# Patient Record
Sex: FEMALE | Race: BLACK OR AFRICAN AMERICAN | Employment: UNEMPLOYED | URBAN - METROPOLITAN AREA
[De-identification: names, ages, dates, MRNs, and addresses within clinical notes are randomized per-mention and may not be internally consistent; named-entity substitution may affect disease eponyms.]

---

## 2022-02-28 ENCOUNTER — HOSPITAL ENCOUNTER (EMERGENCY)
Age: 22
Discharge: LEFT BEFORE DISCHARGE | End: 2022-03-01
Attending: EMERGENCY MEDICINE
Payer: COMMERCIAL

## 2022-02-28 VITALS
WEIGHT: 160 LBS | OXYGEN SATURATION: 100 % | TEMPERATURE: 98.6 F | BODY MASS INDEX: 24.25 KG/M2 | DIASTOLIC BLOOD PRESSURE: 94 MMHG | HEART RATE: 105 BPM | RESPIRATION RATE: 16 BRPM | HEIGHT: 68 IN | SYSTOLIC BLOOD PRESSURE: 116 MMHG

## 2022-02-28 DIAGNOSIS — T74.21XA SEXUAL ASSAULT OF ADULT, INITIAL ENCOUNTER: Primary | ICD-10-CM

## 2022-02-28 LAB
BILIRUB UR QL: NEGATIVE
GLUCOSE UR QL STRIP.AUTO: NEGATIVE MG/DL
KETONES UR-MCNC: NEGATIVE MG/DL
LEUKOCYTE ESTERASE UR QL STRIP: NEGATIVE
NITRITE UR QL: NEGATIVE
PH UR: 6 [PH] (ref 5–9)
PROT UR QL: NEGATIVE MG/DL
RBC # UR STRIP: NEGATIVE /UL
SERVICE CMNT-IMP: ABNORMAL
SP GR UR: >1.03 (ref 1–1.02)
UROBILINOGEN UR QL: 0.2 EU/DL (ref 0.2–1)

## 2022-02-28 PROCEDURE — 81003 URINALYSIS AUTO W/O SCOPE: CPT

## 2022-02-28 PROCEDURE — 99282 EMERGENCY DEPT VISIT SF MDM: CPT
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
## 2022-03-01 RX ORDER — METRONIDAZOLE 500 MG/1
2000 TABLET ORAL
Status: DISCONTINUED | OUTPATIENT
Start: 2022-03-01 | End: 2022-03-01 | Stop reason: HOSPADM

## 2022-03-01 RX ORDER — AZITHROMYCIN 250 MG/1
1000 TABLET, FILM COATED ORAL
Status: DISCONTINUED | OUTPATIENT
Start: 2022-03-01 | End: 2022-03-01 | Stop reason: HOSPADM

## 2022-03-01 NOTE — ED NOTES
Pt states that she needs to go home to feed her mother. Told pt would rather her stay. Pt stated she would be right back, no longer than 30 minutes.
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
## 2023-06-20 NOTE — ED NOTES
Officer Mandy Bocanegra ([phone], option 1) from [facility] called, stated that [place] does allow for anonymous kits to be performed. Case number [id number] generated. [] [facility]  [address]  [phone]  F: [phone] [] [address]  [facility]   [address]  P: [phone]  F: [phone] [x] [facility]  [address]  P: [phone]  F: [phone] [] [address]  P: [phone]  F: [phone] [] [address]  [facility]   [address]   [place]: [phone]  F: [phone]      Physical Therapy Daily Treatment Note    Date:  2023  Patient Name:  Donnell Cuba    :  1962  MRN: [id number]  Physician: Skip Tompkins DO                                Insurance: [employer] (27 visit limt, no hard max, auth required, auth after 30 visits)  Medical Diagnosis: Primary osteoarthritis of one knee, right, Status post surgery             Rehab Codes: M25.561, R26.89  Onset date: 23               Next Dr's appt.: 23  Visit# / total visits: 10/20  Cancels/No Shows: 0    Subjective:    Pain:  [x] Yes  [] No Location: R knee  Pain Rating: (0-10 scale) 4/10  Pain altered Tx:  [x] No  [] Yes  Action:  Comments: Feels she overdid it this weekend and was very painful. Had to take more of her pain meds. Arrives with walker and SPC, hasn't felt good enough to get around with just walker.      Objective:  Modalities: vaso compression R knee elevated, 34deg, medium pressure, 15 min  Precautions: standard   Exercises:  Exercise Reps/ Time Weight/ Level Comments    Nustep 10'  L3 Assist prn x          SB stretch 3x30\" L2  x   HS stretch 3x30\"   x   Stool flexion stretch 5x10\"   x   Heel raise 2x10   x   Weight Shifts X10    x   Alt March x10   x   R HS curl x10   x   Step Ups - fwd/lat x10ea 6in  x   Step downs 2x10 4in First set 2in, second set 4ini

## 2024-09-02 ENCOUNTER — HOSPITAL ENCOUNTER (EMERGENCY)
Age: 24
Discharge: HOME | End: 2024-09-02

## 2024-09-02 VITALS
TEMPERATURE: 98.4 F | HEART RATE: 100 BPM | HEIGHT: 67 IN | OXYGEN SATURATION: 99 % | RESPIRATION RATE: 16 BRPM | WEIGHT: 195 LBS | BODY MASS INDEX: 30.61 KG/M2 | DIASTOLIC BLOOD PRESSURE: 91 MMHG | SYSTOLIC BLOOD PRESSURE: 135 MMHG
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
## 2024-09-02 DIAGNOSIS — S40.861A INSECT BITE OF RIGHT UPPER ARM, INITIAL ENCOUNTER: Primary | ICD-10-CM

## 2024-09-02 PROCEDURE — 99282 EMERGENCY DEPT VISIT SF MDM: CPT

## 2024-09-02 ASSESSMENT — PAIN - FUNCTIONAL ASSESSMENT: PAIN_FUNCTIONAL_ASSESSMENT: 0-10

## 2024-09-02 ASSESSMENT — PAIN DESCRIPTION - PAIN TYPE: TYPE: ACUTE PAIN

## 2024-09-02 ASSESSMENT — PAIN SCALES - GENERAL: PAINLEVEL_OUTOF10: 2

## 2024-09-02 ASSESSMENT — PAIN DESCRIPTION - LOCATION: LOCATION: ARM

## 2024-09-02 ASSESSMENT — PAIN DESCRIPTION - ORIENTATION: ORIENTATION: RIGHT

## 2024-09-02 NOTE — ED PROVIDER NOTES
Nose normal.      Mouth/Throat:      Mouth: Mucous membranes are moist.      Pharynx: Oropharynx is clear.   Eyes:      Extraocular Movements: Extraocular movements intact.      Pupils: Pupils are equal, round, and reactive to light.   Cardiovascular:      Rate and Rhythm: Normal rate and regular rhythm.      Pulses: Normal pulses.      Heart sounds: Normal heart sounds.   Pulmonary:      Effort: Pulmonary effort is normal.      Breath sounds: Normal breath sounds. No rales.   Chest:      Chest wall: No tenderness.   Abdominal:      Tenderness: There is no abdominal tenderness.      Hernia: No hernia is present.   Musculoskeletal:         General: Normal range of motion.      Cervical back: Normal range of motion and neck supple.   Skin:     General: Skin is warm and dry.      Findings: Lesion present.      Comments: Right upper inner arm with single red flat lesion macular in nature there is no streaking there is no drainage there is some slight tenderness to palpation.  Lesion consistent with possible insect bite   Neurological:      General: No focal deficit present.      Mental Status: She is alert.   Psychiatric:         Mood and Affect: Mood normal.         Behavior: Behavior normal.        Procedures     Procedures    No orders of the defined types were placed in this encounter.       Medications given during this emergency department visit:  Medications - No data to display    New Prescriptions    No medications on file        Past Medical History:   Diagnosis Date    Acne     Back pain     Bronchitis     Contact dermatitis     Dysuria     Eczema     Feeding problem     History of diaper rash     Laceration     Mold exposure     Polyuria     Sleep disturbance     Tinea capitis     URI (upper respiratory infection)     Urinary frequency     Wheezing         Past Surgical History:   Procedure Laterality Date    HEENT      tonsillectomy         Social History     Socioeconomic History    Marital status: Single

## 2024-09-02 NOTE — DISCHARGE INSTRUCTIONS
Use over-the-counter anti-itch cream to the area twice a day do not scratch the area.  Return to ER for any worsening symptoms